# Patient Record
Sex: MALE | Race: AMERICAN INDIAN OR ALASKA NATIVE
[De-identification: names, ages, dates, MRNs, and addresses within clinical notes are randomized per-mention and may not be internally consistent; named-entity substitution may affect disease eponyms.]

---

## 2019-02-13 ENCOUNTER — HOSPITAL ENCOUNTER (EMERGENCY)
Dept: HOSPITAL 11 - JP.ED | Age: 24
LOS: 1 days | Discharge: LEFT BEFORE DISCHARGE | End: 2019-02-14
Payer: SELF-PAY

## 2019-02-13 DIAGNOSIS — S02.2XXA: ICD-10-CM

## 2019-02-13 DIAGNOSIS — S60.511A: ICD-10-CM
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
## 2019-02-13 DIAGNOSIS — S02.32XA: Primary | ICD-10-CM

## 2019-02-14 NOTE — CRLCT
INDICATION:



Trauma



TECHNIQUE:



CT maxillofacial without contrast.



COMPARISON:



None available 



FINDINGS:



There is the left orbital floor fracture with up to 2 mm inferior 

displacement. There is apparent extension to the inferior aspect of the 

left lamina papyracea. There is a right nasal fracture involving the nasal 

process of the right maxilla. There is left periorbital, mild preseptal, 

left facial and right nasal soft tissue swelling. The orbital contents 

appear grossly symmetrical. There is blood and debris in the left maxillary 

sinus and small blood in the inferior aspect of the left ethmoid sinus. 



IMPRESSION:



A left orbital floor fracture extending to the inferior aspect of the left 

lamina papyracea. A right nasal bone fracture.



Dictated by Robert Holman MD @ 2/14/2019 12:50:14 AM



Please note that all CT scans at this facility use dose modulation, 

iterative reconstruction, and/or weight-based dosing when appropriate to 

reduce radiation dose to as low as reasonably achievable.



Dictated by: Robert Holman MD @ 02/14/2019 00:50:20



(Electronically Signed)
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
## 2019-02-14 NOTE — CRLCT
INDICATION:



Trauma



TECHNIQUE:



CT head without contrast.



COMPARISON:



None available 



FINDINGS:



The ventricles and sulci are within normal limits.  There is no mass effect 

or midline shift.  There is no loss of gray-white differentiation. There is 

a curvilinear pericortical density in the right frontal lobe on image 25, 

with apparent thin curvilinear extension into the adjacent right corona 

radiata parenchyma on image 26, possibly representing a variant vascular 

structure. 



There is a partially imaged fracture lucency in the left orbital floor. 

There is mild left periorbital soft tissue swelling. 



An air-fluid level is seen in the left maxillary sinus. The mastoid air 

cells are clear. The visualized orbits are grossly unremarkable. 



IMPRESSION:



A curvilinear pericortical density in the right frontal lobe which could be 

related to a variant vascular structure, however a short-term followup 

study in 4-6 hours, with thin cuts and reformatted images, is recommended, 

to exclude small subarachnoid blood. 



A partially imaged left orbital floor fracture.



Dictated by Robert Holman MD @ 2/14/2019 12:41:44 AM



Please note that all CT scans at this facility use dose modulation, 

iterative reconstruction, and/or weight-based dosing when appropriate to 

reduce radiation dose to as low as reasonably achievable.



Dictated by: Robert Holman MD @ 02/14/2019 00:41:52



(Electronically Signed)